# Patient Record
Sex: FEMALE
[De-identification: names, ages, dates, MRNs, and addresses within clinical notes are randomized per-mention and may not be internally consistent; named-entity substitution may affect disease eponyms.]

---

## 2018-08-29 ENCOUNTER — HOSPITAL ENCOUNTER (INPATIENT)
Dept: HOSPITAL 31 - C.ER | Age: 53
LOS: 5 days | Discharge: HOME | DRG: 430 | End: 2018-09-03
Attending: PSYCHIATRY & NEUROLOGY | Admitting: PSYCHIATRY & NEUROLOGY
Payer: COMMERCIAL

## 2018-08-29 DIAGNOSIS — E11.9: ICD-10-CM

## 2018-08-29 DIAGNOSIS — R45.851: ICD-10-CM

## 2018-08-29 DIAGNOSIS — Z81.8: ICD-10-CM

## 2018-08-29 DIAGNOSIS — F41.9: ICD-10-CM

## 2018-08-29 DIAGNOSIS — F32.3: Primary | ICD-10-CM

## 2018-08-29 DIAGNOSIS — I10: ICD-10-CM

## 2018-08-29 DIAGNOSIS — Z87.891: ICD-10-CM

## 2018-08-29 LAB
ALBUMIN SERPL-MCNC: 4.6 [, G/DL] (ref 3.5–5)
ALBUMIN/GLOB SERPL: 1.5 [,] (ref 1–2.1)
ALT SERPL-CCNC: 35 [, U/L] (ref 9–52)
AST SERPL-CCNC: 24 [, U/L] (ref 14–36)
BASOPHILS # BLD AUTO: 0.1 [, K/UL] (ref 0–0.2)
BASOPHILS NFR BLD: 0.9 [, %] (ref 0–2)
BILIRUB UR-MCNC: NEGATIVE [,]
BUN SERPL-MCNC: 5 [, MG/DL] (ref 7–17)
CALCIUM SERPL-MCNC: 9.4 [, MG/DL] (ref 8.6–10.4)
EOSINOPHIL # BLD AUTO: 0 [, K/UL] (ref 0–0.7)
EOSINOPHIL NFR BLD: 0.7 [, %] (ref 0–4)
ERYTHROCYTE [DISTWIDTH] IN BLOOD BY AUTOMATED COUNT: 14.3 [, %] (ref 11.5–14.5)
GFR NON-AFRICAN AMERICAN: > 60 [,]
GLUCOSE UR STRIP-MCNC: NORMAL [, MG/DL]
HGB BLD-MCNC: 12.8 [, G/DL] (ref 11–16)
LEUKOCYTE ESTERASE UR-ACNC: (no result) [, LEU/UL]
LYMPHOCYTES # BLD AUTO: 1.6 [, K/UL] (ref 1–4.3)
LYMPHOCYTES NFR BLD AUTO: 21.7 [, %] (ref 20–40)
MCH RBC QN AUTO: 28.4 [, PG] (ref 27–31)
MCHC RBC AUTO-ENTMCNC: 33.2 [, G/DL] (ref 33–37)
MCV RBC AUTO: 85.5 [, FL] (ref 81–99)
MONOCYTES # BLD: 0.4 [, K/UL] (ref 0–0.8)
MONOCYTES NFR BLD: 5.5 [, %] (ref 0–10)
NEUTROPHILS # BLD: 5.3 [, K/UL] (ref 1.8–7)
NEUTROPHILS NFR BLD AUTO: 71.2 [, %] (ref 50–75)
NRBC BLD AUTO-RTO: 0 [, %] (ref 0–2)
PH UR STRIP: 8 [,] (ref 5–8)
PLATELET # BLD: 183 [, K/UL] (ref 130–400)
PMV BLD AUTO: 9.3 [, FL] (ref 7.2–11.7)
PROT UR STRIP-MCNC: NEGATIVE [, MG/DL]
RBC # BLD AUTO: 4.5 [, MIL/UL] (ref 3.8–5.2)
RBC # UR STRIP: (no result) [,]
SP GR UR STRIP: 1.01 [,] (ref 1–1.03)
SQUAMOUS EPITHIAL: 1 [, /HPF] (ref 0–5)
UROBILINOGEN UR-MCNC: NORMAL [, MG/DL] (ref 0.2–1)
WBC # BLD AUTO: 7.4 [, K/UL] (ref 4.8–10.8)

## 2018-08-29 PROCEDURE — GZ58ZZZ INDIVIDUAL PSYCHOTHERAPY, COGNITIVE-BEHAVIORAL: ICD-10-PCS | Performed by: PSYCHIATRY & NEUROLOGY

## 2018-08-29 PROCEDURE — GZ56ZZZ INDIVIDUAL PSYCHOTHERAPY, SUPPORTIVE: ICD-10-PCS | Performed by: PSYCHIATRY & NEUROLOGY

## 2018-08-29 NOTE — C.PDOC
History Of Present Illness


52 y/o female with history of depression presents to ED brought by daughters 

for evaluation of hallucinations. Patient states when she is sleeping she feels 

someone is stabbing her, wakes up goes to the mirror and sees the stabbing 

wounds on her body. As per patient she denies SI or HI but daughter at bedside 

states patient has said she has suicidal thoughts. No other complaints at this 

time


Time Seen by Provider: 18 15:02


Chief Complaint (Nursing): Psychiatric Evaluation


History Per: Patient


History/Exam Limitations: no limitations


Onset/Duration Of Symptoms: Days


Current Symptoms Are (Timing): Still Present


Suicide/Self Injury Attempted (Context): None


Modifying Factor(s): None


Associated Symptoms: Depression, Suicidal Thoughts





Past Medical History


Reviewed: Historical Data, Nursing Documentation, Vital Signs


Vital Signs: 


 Last Vital Signs











Temp  98.3 F   18 14:43


 


Pulse  100 H  18 14:43


 


Resp  18   18 14:43


 


BP  149/89   18 14:43


 


Pulse Ox  97   18 15:47














- Medical History


PMH: Anxiety, Depression, HTN


Surgical History: 


Family History: States: Unknown Family Hx





- Social History


Hx Tobacco Use: No


Hx Alcohol Use: No


Hx Substance Use: No





- Immunization History


Hx Tetanus Toxoid Vaccination: No


Hx Influenza Vaccination: No


Hx Pneumococcal Vaccination: No





Review Of Systems


Except As Marked, All Systems Reviewed And Found Negative.


Psych: Positive for: Depression





Physical Exam





- Physical Exam


Appears: Non-toxic, No Acute Distress


Skin: Normal Color, Warm, Dry, No Rash


Head: Atraumatic, Normacephalic


Eye(s): bilateral: Normal Inspection


Oral Mucosa: Moist


Neck: Supple


Cardiovascular: Rhythm Regular


Respiratory: Normal Breath Sounds, No Rales, No Rhonchi, No Wheezing


Gastrointestinal/Abdominal: Soft, No Tenderness, No Guarding, No Rebound


Extremity: Normal ROM, Capillary Refill (<2 seconds)


Neurological/Psych: Oriented x3, Normal Speech, Normal Cognition





ED Course And Treatment





- Laboratory Results


Result Diagrams: 


 18 16:19





 18 16:19


O2 Sat by Pulse Oximetry: 97 (RA)


Pulse Ox Interpretation: Normal





Medical Decision Making


Medical Decision Making: 


Assessment: Depression, Hallucination








Plan:


crisis will evaluate patient.  Patient medically cleared.  Patient admitted to 

hospital, Dr. Davenport's service for depression 





Disposition


Discussed With : Ulices Davenport


Doctor Will See Patient In The: Hospital


Counseled Patient/Family Regarding: Studies Performed, Diagnosis





- Disposition


Disposition: HOSPITALIZED


Disposition Time: 17:16


Condition: FAIR


Forms:  [facility] (English)





- Clinical Impression


Clinical Impression: 


 Depression








- Scribe Statement


The provider has reviewed the documentation as recorded by the Sushmaibmaria isabel Lutz





All medical record entries made by the Sushmaibmaria isabel were at my direction and 

personally dictated by me. I have reviewed the chart and agree that the record 

accurately reflects my personal performance of the history, physical exam, 

medical decision making, and the department course for this patient. I have 

also personally directed, reviewed, and agree with the discharge instructions 

and disposition.

## 2018-08-30 NOTE — PCM.PSYCH
Initial Psychiatric Evaluation





- Initial Psychiatric Evaluation


Type of Admission: Voluntary


Legal Status: Capacity


Chief Complaint (in patient's own words): 


I came to appease my daughter. She thinks I'm depressed.





History of Present Illness and Precipitating Events: 


PGY-1 Initial Psychiatric Evaluation for Dr. Davenport.





Patient is a 53 year old female with past history of MDD with psychotic 

features who is  (  4 months ago), has 3 grown children, 

lives with daughter and granchildren, and is unemployed (last worked at [employer] 2 years ago).  





As per Crisis worker, patient was brought in by daughters due to paranoid 

behavior for the past several weeks, responding to internal stimuli and not 

functioning at her normal baseline. Patient is in treatment at [facility] and under 

the care of Dr. Godoy who had prescribed patient Risperdal, Seroquel and 

Trazodone. Patient is reportedly compliant with medication. In ED, patient 

appeared euphoric and animated throughout the evaluation process. Presently, 

patient is paranoid, delusional and noted to have circumstantial speech. 

Patient states "there is someone who comes into my home while I'm napping on 

the couch and injects me with something. I can feel the pinch when they inject 

me, but when I wake up there is no one there. Whatever they inject me with has 

made my face look different. I have the red needle marks on my skin" (patient 

points to hemangiomas on chest and L leg). Patient states she had similar 

symptoms in the past, approximately 6 months ago which resolved after being 

treated inpatient, however symptoms have returned in the last few weeks (states 

was treated at [facility], but last admission is recorded as 3/2015). 

Patient admits to periods of increased energy, decreased sleep, irritibility 

and increased appetite and weight gain. Patient denies depressed mood, anhedonia

, difficulty concentrating, feelings of guilt or hopelessness, suicidal ideation

, homicidal ideation, anxiety, excessive worrying, panic, auditory and visual 

hallucinations. Patient is a former smoker, smoked 3 cigarettes per day, quit 

one year ago. Drinks socially 2-3 times per year. Denies illicit drug use. 





Psych hx: MDD with psychotic features


Family psych hx: one sister with depression, other sister with unspecified 

psychiatric disorder


Psych Hospitalizations: Multiple in [place] and [place]. One 

admission at [facility] 3/2015 for psychosis. 


Psychiatrist: Dr. Jayne KHOURY


PMHx: HTN, DM





Current Medications: 





Active Medications











Generic Name Dose Route Start Last Admin





  Trade Name Freq  PRN Reason Stop Dose Admin


 


Amlodipine Besylate  5 mg  18 10:00  18 09:30





  Norvasc  PO   5 mg





  DAILY HARSHAL   Administration


 


Hydroxyzine HCl  25 mg  18 00:39  18 00:53





  Atarax  PO   25 mg





  Q6H PRN   Administration





  Anxiety   


 


Metformin HCl  1,000 mg  18 19:45  18 09:28





  Glucophage  PO   1,000 mg





  BID HARSHAL   Administration


 


Quetiapine Fumarate  300 mg  18 22:00  18 21:21





  Seroquel  PO   300 mg





  HS HARSHAL   Administration


 


Rosuvastatin Calcium  5 mg  18 22:00  18 21:22





  Crestor  PO   5 mg





  HS HARSHAL   Administration


 


Trazodone HCl  100 mg  18 22:00  18 21:22





  Desyrel  PO   100 mg





  HS HARSHAL   Administration














Past Psychiatric History





- Past Psychiatric History


Previous Treatment History: Inpatient


Pertinent Medical Hx (Current Medical&Sleep Prob, Allergies): 





 Allergies











Allergy/AdvReac Type Severity Reaction Status Date / Time


 


No Known Allergies Allergy   Verified 18 14:39








 





Atorvastatin Calcium 20 mg PO DAILY 18 


MetFORMIN [glucOPHAGE] 1,000 mg PO BID 18 


Quetiapine Fumarate [Quetiapine Fumarate ER] 300 mg PO DAILY 18 


amLODIPine [Norvasc] 5 mg PO DAILY 18 


risperiDONE [RisperDAL] 2 mg PO DAILY 18 


traZODone [Desyrel] 100 mg PO DAILY 18 











Review of Systems





- Psychiatric


Psychiatric: Abnormal Sleep Pattern, Behavioral Changes, Change in Appetite, 

Irritability, Paranoia, Tactile Hallucinations.  absent: Anhedonia, Anxiety, 

Auditory Hallucinations, Confusion, Depression, Difficulty Concentrating, 

Homicidal Ideation, Hopelessness, Memory Loss, Mood Swings, Panic Attacks, 

Suicidal Ideation, Visual Hallucinations





Mental Status Examination





- Personal Presentation


Personal Presentation: Looks stated age





- Affect


Affect: Broad





- Reliability in Providing Information


Reliability in Providing Information: Poor, due to alteration in thoughts





- Speech


Speech: Disorganized, Irrelevant, Other (circumstantial)





- Mood


Mood: Neutral





- Formal Thought Process


Formal Thought Process: Hallucinations, Delusions, Paranoia, Circumstantial





- Hallucinations/Delusions


Hallucinations: Other (tactile)





- Obsessions/Compulsions


Obsessions: No


Compulsions: No





- Cognitive Functions


Orientation: Person, Place, Time


Sensorium: Alert


Attention/Concentration: Attentive


Judgement: Imparied, as evidence by: Poor judgement





- Strength & Assets Inventory


Strength & Assets Inventory: Family support





DSM 5 DX





- DSM 5


DSM 5 Diagnosis: 


Major depressive disorder with psychotic features vs. schizoaffective disorder








- Recommended/Plan of Treatment


Treatment Recommendations and Plan of Treatment: 


Major depressive disorder with psychotic features vs. schizoaffective disorder-

depressed





Quietapine 300mg PO HS


Trazodone 100mg PO HS


Atarax 25mg PO Q6H PRN





As need medications


All risks, benefits and alternatives of the meds discussed,


 and the pt agreed and understood. 


Attend groups and activities


Individual therapy daily


Psychoeducation and support daily


Encourage compliance with meds and after care


Refer to outpatient program 


Teach healthy lifestyle methods, i.e. diet, exercise, meditation








- Smoking Cessation


Smoking Cessation Initiated: No

## 2018-08-31 NOTE — PCM.PYCHPN
Psychiatric Progress Note





- Psychiatric Progress Note


Patient seen today, length of contact: 15 min


Patient Chief Complaint: 





I m feeling little better


Problems Identified/Issues Discussed: 





Patient seen and evaluated, chart reviewed and discussed with the nurse. 


Pt reports depressed mood, but reports some improvement in her feelings of 

hopelessness and helplessness. 


She appears more organized and reports some improvement in her paranoia.  


She remained isolated and withdrawn, and confined to his room.  


Patient is compliant with medications and denies any side effects. 


Symptoms are improving but pt needs more time to stabilize. 


Support and psychoeducation given. 





Medication Change: Yes


Medical Record Reviewed: Yes





Mental Status Examination





- Cognitive Function


Orientation: Person, Place, Situation, Time


Memory: Intact


Attention: WNL


Concentration: Poor


Association: Loose


Fund of Knowledge: WNL





- Mood


Mood: Depressed, Anxious





- Affect


Affect: Constricted, Depressed





- Speech


Speech: Soft





- Formal Thought Process


Formal Thought Process: Hallucinations, Delusions, Paranoia, Circumstantial





- Suicidal Ideation


Suicidal Ideation: No





- Homicidal Ideation


Homicidal Ideation: No





Goal/Treatment Plan





- Goal/Treatment Plan


Need for Continued Stay: Severe depression anxiety, Severe functional impairment


Progress Toward Problem(s) and Goals/Treatment Plan: 





Major depressive disorder with psychotic features 


r/o Schizoaffective disorder





CBT


Psychoeducation and support daily


Quietapine 100mg PO HS


Trazodone 100mg PO HS


Atarax 25mg PO Q6H PRN


Risperdal 2 mg  PO QHS





As need medications


All risks, benefits and alternatives of the meds discussed,


 and the pt agreed and understood. 


Attend groups and activities


Individual therapy daily


Encourage compliance with meds and after care


Refer to outpatient program 


Teach healthy lifestyle methods, i.e. diet, exercise, meditation

## 2018-09-01 NOTE — PCM.PYCHPN
Psychiatric Progress Note





- Psychiatric Progress Note


Patient seen today, length of contact: 15 min


Patient Chief Complaint: 





I'm feeling better.


Problems Identified/Issues Discussed: 





Patient seen, chart reviewed, case discussed with the staff.





Issues related to illness and treatment were discussed with the patient and 

staff.





Reported compliant with treatment with no adverse affects.





Tolerating treatment very well.





Patient reported feeling better.





Needs more time for stabilization.





Patient was calm and cooperative.





Patient was awake alert oriented 3 with good eye contact.





Mood reported as good, affect appropriate.





Aftercare discussed with the patient.





At the time of evaluation, patient was awake alert oriented 3, had no delusions

, no auditory or visual hallucinations.


Medical Problems: 





Hypertension


Diabetes mellitus


Diagnostic Results: 





Reviewed


DSM 5 Symptoms Update: 





Some improvement with treatment


Medication Change: No


Medical Record Reviewed: Yes





Mental Status Examination





- Cognitive Function


Orientation: Person, Place, Situation, Time


Memory: Intact


Attention: WNL


Concentration: WNL


Association: WN


Fund of Knowledge: [facility]


Decription of patient's judgement and insights: 





Fair





- Mood


Mood: Anxious





- Affect


Affect: Other (Appropriate)





- Speech


Speech: Soft





- Formal Thought Process


Formal Thought Process: No Impairment


Psychotic Thoughts and Behaviors: 





None





- Suicidal Ideation


Suicidal Ideation: No





- Homicidal Ideation


Homicidal Ideation: No





Goal/Treatment Plan





- Goal/Treatment Plan


Need for Continued Stay: Remain at risks for inpatient hospitalization, 

Discharge may exacerbated symptoms, Severe functional impairment


Progress Toward Problem(s) and Goals/Treatment Plan: 





Patient education.


Supportive therapy.


Continue treatment as before.


We'll go to [facility] for follow-up care after discharge from the 

hospital.


Estimated Date of D/C: 09/03/18





- Smoking Cessation


Smoking Cessation Initiated: No

## 2018-09-03 VITALS
HEART RATE: 83 BPM | TEMPERATURE: 97.8 F | SYSTOLIC BLOOD PRESSURE: 118 MMHG | DIASTOLIC BLOOD PRESSURE: 75 MMHG | OXYGEN SATURATION: 95 % | RESPIRATION RATE: 20 BRPM

## 2018-09-03 NOTE — PCM.PYCHPN
Psychiatric Progress Note





- Psychiatric Progress Note


Patient seen today, length of contact: 15 min


Medication Change: Yes


Medical Record Reviewed: Yes





Mental Status Examination





- Cognitive Function


Orientation: Person, Place, Situation, Time


Memory: Intact


Attention: WNL


Concentration: WNL


Association: WNL


Fund of Knowledge: WNL





- Mood


Mood: Anxious





- Affect


Affect: Other (Appropriate)





- Speech


Speech: Soft





- Formal Thought Process


Formal Thought Process: No Impairment





- Suicidal Ideation


Suicidal Ideation: No





- Homicidal Ideation


Homicidal Ideation: No





Goal/Treatment Plan





- Goal/Treatment Plan


Need for Continued Stay: Remain at risks for inpatient hospitalization, 

Discharge may exacerbated symptoms, Severe functional impairment


Estimated Date of D/C: 09/03/18

## 2018-09-03 NOTE — PCM.PYCHDC
Mental Status Examination





- Mental Status Examination


Orientation: Person





Discharge Summary





- Discharge Note


Laboratory Data: 





 Abnormal Lab Results











  09/02/18 09/03/18





  16:18 07:31


 


POC Glucose (mg/dL)  119 H  159 H











Consultations:: List each consultation separately and include:  1. Reason for 

request.  2. Findings.  3. Follow-up


Summary of Hospital Course include:: 1. Description of specific treatment plan 

utilized for patients during their course of treatmen.  2. Summarize the time-

course for resolution of acute symptoms and/or regressed behaviors.  3. 

Describe issues identified and worked on during hospitalization.  4. Describe 

medication utilized.  5. Describe medical problems identified and treated.  6. 

Reassessment of suicide risk


Summary of Hospital Course: 











She will attend [facility].





- Final Diagnosis (DSM 5)


Condition upon Discharge: FAIR


Disposition: HOME/ ROUTINE


Prescriptions/Medication Reconciliation: 


amLODIPine [Norvasc] 5 mg PO DAILY #30 tab


Atorvastatin Calcium 20 mg PO DAILY #30 tablet


metFORMIN [glucOPHAGE] 1,000 mg PO BID #60 tab


risperiDONE [RisperDAL Tab] 2 mg PO HS #30 tab


traZODone [Desyrel] 100 mg PO HS #30 tab